# Patient Record
Sex: FEMALE | Race: WHITE | Employment: FULL TIME | URBAN - METROPOLITAN AREA
[De-identification: names, ages, dates, MRNs, and addresses within clinical notes are randomized per-mention and may not be internally consistent; named-entity substitution may affect disease eponyms.]

---

## 2023-12-25 ENCOUNTER — HOSPITAL ENCOUNTER (EMERGENCY)
Facility: HOSPITAL | Age: 33
Discharge: HOME | End: 2023-12-25
Attending: EMERGENCY MEDICINE
Payer: COMMERCIAL

## 2023-12-25 VITALS
SYSTOLIC BLOOD PRESSURE: 129 MMHG | OXYGEN SATURATION: 98 % | DIASTOLIC BLOOD PRESSURE: 90 MMHG | BODY MASS INDEX: 35.36 KG/M2 | RESPIRATION RATE: 20 BRPM | HEIGHT: 66 IN | HEART RATE: 112 BPM | TEMPERATURE: 99 F | WEIGHT: 220 LBS

## 2023-12-25 DIAGNOSIS — J02.9 PHARYNGITIS, UNSPECIFIED ETIOLOGY: Primary | ICD-10-CM

## 2023-12-25 PROCEDURE — 99283 EMERGENCY DEPT VISIT LOW MDM: CPT

## 2023-12-25 RX ORDER — ACETAMINOPHEN 500 MG
1000 TABLET ORAL ONCE
Status: COMPLETED | OUTPATIENT
Start: 2023-12-25 | End: 2023-12-25

## 2023-12-25 RX ORDER — PENICILLIN V POTASSIUM 500 MG/1
500 TABLET ORAL 4 TIMES DAILY
Qty: 40 TABLET | Refills: 0 | Status: SHIPPED | OUTPATIENT
Start: 2023-12-25 | End: 2024-01-04

## 2023-12-25 NOTE — ED QUICK NOTES
Alert oriented, no distress noted. C/o throat pain. Pt appears non toxic. This writer asked patient about swallowing pills prior to tylenol admin considering she's having difficulty wallowing per triage note. Pt states she can swallow the pills and medication was administered per mar.

## 2023-12-25 NOTE — ED INITIAL ASSESSMENT (HPI)
Pt presents stating a head cold for the past few days that she was handling. Then she started having pain to the left side of her tongue which has now progressed to the left side of her face and throat with pain swallowing and talking.

## 2024-11-29 NOTE — ED INITIAL ASSESSMENT (HPI)
Patient to ER with c/o abnormally heavy menstrual bleeding since 2000 yesterday. Patient's period first started yesterday. Patient with associated SOB and dizziness with any walking early afternoon today. Nausea intermittently. Patient is changing pad every hour.

## 2024-11-29 NOTE — CONSULTS
[facility]  Report of Consultation    Jermaine Leigh Patient Status:  Inpatient    1990 MRN [id number]   Location [facility] 4NW-A Attending Rossy De La O,    Hosp Day # 0 PCP No primary care provider on file.     Reason for Consultation:  Heavy menstrual bleeding to anemia    History of Present Illness:  Jermaine Leigh is a a(n) 34 year old G0 admitted for AUB to anemia. Pt reports this is the first time her menses has been so heavy - usually when it is heavy it is limited to a few hours, but this lasted a couple of days. She has never needed medication to regulate. Admittedly, she has not really seen a doctor since prior to the covid pandemic. She came to ED because she was feeling lightheaded and fatigued. Does feel like her bleeding has lessened today and overall feeling much better after blood transfusion.    History:  Past Medical History:    ADHD    Depression     Past Surgical History:   Procedure Laterality Date    Adenoidectomy  age 14 or 15    Appendectomy  2010    [facility] - laproscopic     Family History   Problem Relation Age of Onset    Mental Disorder Mother         depression    High Blood Pressure Maternal Grandmother     Other (Other) Maternal Grandmother         osteoporosis    High Blood Pressure Maternal Grandfather     Heart Disorder Maternal Grandfather         BYPASS SURGERY      reports that she has never smoked. She has never used smokeless tobacco. She reports that she does not drink alcohol and does not use drugs.    Gyn History:  Periods regular q4 weeks, lasting 4-6 days.  No h/o pap, maybe once had a pelvic exam.    OB History:  G0. No plans for pregnancy.    Allergies:  Allergies[1]    Medications:    Current Facility-Administered Medications:     acetaminophen (Tylenol Extra Strength) tab 500 mg, 500 mg, Oral, Q4H PRN    melatonin tab 3 mg, 3 mg, Oral, Nightly PRN    ondansetron (Zofran) 4 MG/2ML injection 4 mg, 4 mg, Intravenous, Q6H PRN     prochlorperazine (Compazine) 10 MG/2ML injection 5 mg, 5 mg, Intravenous, Q8H PRN    polyethylene glycol (PEG 3350) (Miralax) 17 g oral packet 17 g, 17 g, Oral, Daily PRN    sennosides (Senokot) tab 17.2 mg, 17.2 mg, Oral, Nightly PRN    bisacodyl (Dulcolax) 10 MG rectal suppository 10 mg, 10 mg, Rectal, Daily PRN    fleet enema (Fleet) rectal enema 133 mL, 1 enema, Rectal, Once PRN    benzonatate (Tessalon) cap 200 mg, 200 mg, Oral, TID PRN    glycerin-hypromellose- (Artificial Tears) 0.2-0.2-1 % ophthalmic solution 1 drop, 1 drop, Both Eyes, QID PRN    sodium chloride (Saline Mist) 0.65 % nasal solution 1 spray, 1 spray, Each Nare, Q3H PRN    escitalopram (Lexapro) tab 10 mg, 10 mg, Oral, QAM    Review of Systems:  Pertinent items are noted in HPI.    Physical Exam:  Blood pressure 122/70, pulse 98, temperature 98.5 °F (36.9 °C), temperature source Oral, resp. rate 18, height 66\", weight 220 lb (99.8 kg), last menstrual period 11/27/2024, SpO2 99%.    General: Alert, orientated x3.  Cooperative.  No apparent distress.  HEENT: Exam is unremarkable. Mucous membranes are moist.   Resp: breathing comfortably on room air  Abdomen:  Soft, non-distended, non-tender, with no rebound or guarding.  No peritoneal signs.  PELVIC: deferred  Extremities:  No lower extremity edema noted.  Without clubbing or cyanosis.    Skin: Normal texture and turgor.  Neurologic: Cranial nerves are grossly intact. No focal neurologic deficit.    Laboratory Data:   Latest Reference Range & Units 11/28/24 23:44   WBC 4.0 - 11.0 x10(3) uL 13.7 (H)   Hemoglobin 12.0 - 16.0 g/dL 5.9 (LL)   Hematocrit 35.0 - 48.0 % 20.5 (L)   Platelet Count 150.0 - 450.0 10(3)uL 354.0       Impression and Plan:  Patient Active Problem List   Diagnosis    ADD (attention deficit disorder)    Dysthymia    Anemia, unspecified type    Vaginal bleeding    Sinus tachycardia    Renal insufficiency       AUB to anemia  Hospitalist primary - appreciate management  S/p  2 units pRBC, repeat H&H to be collected  Added TSH and Prolactin to lab studies for further evaluation  Planned pelvic US today  Discussed r/b/a of management options to include medical or surgical options.   - medical: POP/OCP, vaginal ring, patch, depo provera, implant or IUD, or TXA  - surgical: endometrial ablation, UAE, or hysterectomy. Would encourage medical option to start.     Plan for outpatient follow up to further discuss management. Will also follow up on pending pelvic US.     All of the findings and plan were discussed with the patient.  Questions were answered to her satisfaction. She notes understanding and agrees with the plan of care.  OK for discharge from GYN standpoint once she is hemodynamically stable. Thank you for this consult.    Total time was 20 minutes, more than 50% of the time was spent in face-to-face counseling and/or coordination of care.      Mary Santiago MD  [facility]  11/29/2024 9:07 AM           [1]   Allergies  Allergen Reactions    Morphine NAUSEA AND VOMITING

## 2024-11-29 NOTE — PROGRESS NOTES
NURSING ADMISSION NOTE      Patient admitted via cart  Oriented to room.  Safety precautions initiated.  Bed in low position.  Call light in reach.  Continues  to have heavy vaginal bleeding. Consent signed. Sent for first unit of blood.  Tylenol for menstrual cramps. 1st unit of blood infusing, without adverse effects. Vitals stable. Seen by Dr De La O    2nd unit of blood started . Dr Morales notified of consult.

## 2024-11-29 NOTE — ED PROVIDER NOTES
Patient Seen in: [facility] [place]      History     Chief Complaint   Patient presents with    Eval-G    Dizziness     Stated Complaint: Heavy bleeding starting at 2000, period started yesterday. Changing pad every h*    Subjective:   Patient is a 34-year-old female presents emergency room for evaluation of vaginal bleeding.  Patient has a history of intermittent heavy vaginal bleeding she does not see an OB/GYN regularly she is not on birth control.  Patient reports her menses started yesterday and she had soaked through a pad about once an hour throughout the night.  Patient started having shortness of breath and lightheadedness and dizziness today.  Patient has a hemoglobin of 5.9 on her blood work.  She has not the most recent hemoglobin is available in epic system is from 14 years ago at which time it was 14                Objective:     Past Medical History:    ADHD    Depression              Past Surgical History:   Procedure Laterality Date    Adenoidectomy  age 14 or 15    Appendectomy  03/11/2010    [facility]                Social History     Socioeconomic History    Marital status: Single   Tobacco Use    Smoking status: Never    Smokeless tobacco: Never   Vaping Use    Vaping status: Never Used   Substance and Sexual Activity    Alcohol use: No    Drug use: No    Sexual activity: Never                  Physical Exam     ED Triage Vitals   BP 11/28/24 2334 (!) 115/95   Pulse 11/28/24 2334 (!) 129   Resp 11/28/24 2334 20   Temp 11/28/24 2334 97.8 °F (36.6 °C)   Temp src 11/28/24 2334 Oral   SpO2 11/28/24 2334 100 %   O2 Device 11/28/24 2330 None (Room air)       Current Vitals:   Vital Signs  BP: 121/77  Pulse: (!) 122  Resp: 20  Temp: 97.8 °F (36.6 °C)  Temp src: Oral  MAP (mmHg): 99    Oxygen Therapy  SpO2: 100 %  O2 Device: None (Room air)        Physical Exam  Vitals and nursing note reviewed.   Constitutional:       General: She is not in acute distress.      Appearance: She is well-developed. She is obese. She is not ill-appearing or toxic-appearing.   HENT:      Head: Normocephalic and atraumatic.      Mouth/Throat:      Mouth: Mucous membranes are moist.   Eyes:      Extraocular Movements: Extraocular movements intact.      Pupils: Pupils are equal, round, and reactive to light.      Comments: Pale conjunctiva   Cardiovascular:      Rate and Rhythm: Regular rhythm. Tachycardia present.      Heart sounds: Normal heart sounds.   Pulmonary:      Effort: Pulmonary effort is normal. No respiratory distress.      Breath sounds: No wheezing.      Comments: Patient speaking in full complete sentences no shortness of breath at rest  Abdominal:      General: Bowel sounds are normal. There is no distension.      Palpations: Abdomen is soft.      Tenderness: There is no abdominal tenderness.   Musculoskeletal:         General: Normal range of motion.   Skin:     Capillary Refill: Capillary refill takes less than 2 seconds.      Coloration: Skin is pale.   Neurological:      Mental Status: She is alert and oriented to person, place, and time.      GCS: GCS eye subscore is 4. GCS verbal subscore is 5. GCS motor subscore is 6.      Cranial Nerves: No cranial nerve deficit.   Psychiatric:         Mood and Affect: Mood normal. Mood is not anxious.         Behavior: Behavior normal.             ED Course     Labs Reviewed   CBC WITH DIFFERENTIAL WITH PLATELET - Abnormal; Notable for the following components:       Result Value    WBC 13.7 (*)     RBC 3.35 (*)     HGB 5.9 (*)     HCT 20.5 (*)     MCV 61.2 (*)     MCH 17.6 (*)     MCHC 28.8 (*)     Neutrophil Absolute Prelim 9.76 (*)     Neutrophil Absolute 9.76 (*)     All other components within normal limits   COMP METABOLIC PANEL (14) - Abnormal; Notable for the following components:    Glucose 155 (*)     CO2 19.0 (*)     Creatinine 1.18 (*)     All other components within normal limits   RBC MORPHOLOGY SCAN - Abnormal; Notable  for the following components:    RBC Morphology See morphology below (*)     All other components within normal limits   HCG, BETA SUBUNIT (QUANT PREGNANCY TEST) - Normal   PATH COMMENT CBC   TYPE AND SCREEN    Narrative:     The following orders were created for panel order Type and screen.  Procedure                               Abnormality         Status                     ---------                               -----------         ------                     ABORH (Blood Type)[[id number]]                               Final result               Antibody Screen[[id number]]                                  Final result                 Please view results for these tests on the individual orders.   ABORH (BLOOD TYPE)   ANTIBODY SCREEN   ABORH CONFIRMATION   RAINBOW DRAW LAVENDER   RAINBOW DRAW LIGHT GREEN   RAINBOW DRAW BLUE   RAINBOW DRAW GOLD     EKG    Rate, intervals and axes as noted on EKG Report.  Rate: 118  Rhythm:Sinus Rhythm  Readiing  Sinus tachycardia no ST elevation WA interval of 162 QRS is 70 QTc of 442 with axes and 14/41/30                Chest x-ray shows no focal consolidation no pneumothorax or pleural effusion normal, stable silhouette.  No acute osseous abnormalities       MDM      Social -negative tobacco, negative etoh, negative drugs  Family History-noncontributory  Past Medical History-ADHD, depression    Differential diagnosis before testing included anemia vaginal bleeding, endometriosis    Co-morbidities that add to the complexity of management include: Patient has not seen her doctor regularly    Testing ordered during this visit included hemoglobin, pregnancy test    Radiographic images    I personally reviewed the radiographs and my individual interpretation shows no acute process  I also reviewed the official reports that showed   Chest x-ray shows no focal consolidation no pneumothorax or pleural effusion normal, stable silhouette.  No acute osseous abnormalities        External  chart review showed review of Care Everywhere in epic system shows no related comorbidities to current presentation her last hemoglobin was checked in 2010 and at that time her hemoglobin was 14    History obtained by an independent source included from patient, family    Discussion of management with patient, family, hospital    Social determinants of health that affect care include patient does not have a primary care physician.  Her most recent hemoglobin level was from 2010      Medications Provided: Blood transfusions to be started at the [facility]    Course of Events during Emergency Room Visit include 34-year-old female who presents emergency room with vaginal bleeding that started last night.  Patient will be given IV fluids here will get blood transfusion ordered*for the patient once she gets over to the [facility] hospital but as we only have 1 unit of emergency blood at [place] it was not given here as patient is tachycardic but stable.  I discussed this with the hospitalist.  Patient be transported over to the [facility] hospital to have blood transfusion once there.          Disposition:    Admission  I have discussed with the patient the results of test, differential diagnosis, and treatment plan. They expressed clear understanding of these instructions and agrees to the plan provided.       Admission disposition: 11/29/2024 12:26 AM           Medical Decision Making      Disposition and Plan     Clinical Impression:  1. Anemia, unspecified type    2. Vaginal bleeding    3. Sinus tachycardia    4. Renal insufficiency         Disposition:  Admit  11/29/2024 12:26 am    Follow-up:  No follow-up provider specified.        Medications Prescribed:  Current Discharge Medication List              Supplementary Documentation:         Hospital Problems       Present on Admission  Date Reviewed: 3/3/2020            ICD-10-CM Noted POA    * (Principal) Anemia, unspecified type D64.9 11/29/2024 Unknown

## 2024-11-29 NOTE — H&P
[facility]     Jermaine Leigh Patient Status:  Emergency    1990 MRN [id number]   Location [facility] IN [place] Attending Rossy Mcintosh DO   Hosp Day # 0 PCP No primary care provider on file.     Chief Complaint: vaginal bleeding    Subjective:    History of Present Illness:     Jermaine Leigh is a 34 year old female with PMHx depression who presented to the hospital for heavy vaginal bleeding. She started her menstrual cycle on the  and was initially waking up every hour to change her pad. She then started to notice large blood clots. She has a history of heavy periods but the longest the heavy bleeding usually lasts is 12 hours. Her bleeding continued at the same pace during the next day and she was continuing to pass a significant amount of clots. She had associated pelvic cramping rated 2/10 which is typical during her period. She tried to eat some food high in iron and initially felt a little better. Tonight she started to feel dizzy and was getting winded with palpitations and nausea any time she was walking. She had one episode of emesis on her way to to the car.    History/Other:    Past Medical History:  Past Medical History:    ADHD    Depression     Past Surgical History:   Past Surgical History:   Procedure Laterality Date    Adenoidectomy  age 14 or 15    Appendectomy  2010    [facility] - lap[facility]      Family History:   Family History   Problem Relation Age of Onset    Mental Disorder Mother         depression    High Blood Pressure Maternal Grandmother     Other (Other) Maternal Grandmother         osteoporosis    High Blood Pressure Maternal Grandfather     Heart Disorder Maternal Grandfather         BYPASS SURGERY     Social History:    reports that she has never smoked. She has never used smokeless tobacco. She reports that she does not drink alcohol and does not use drugs.     Allergies: Allergies[1]    Medications:   Medications Ordered Prior to Encounter[2]    Review of Systems:   A comprehensive review of systems was completed.    Pertinent positives and negatives noted in the HPI.    Objective:   Physical Exam:    /77   Pulse (!) 122   Temp 97.8 °F (36.6 °C) (Oral)   Resp 20   Ht 5' 6\" (1.676 m)   Wt 220 lb (99.8 kg)   LMP 11/27/2024   SpO2 100%   BMI 35.51 kg/m²   General: No acute distress, Alert  Respiratory: No rhonchi, no wheezes  Cardiovascular: S1, S2. Regular rate and rhythm  Abdomen: Soft, mild pain to palpation of LLQ, non-distended, positive bowel sounds  Neuro: No new focal deficits  Extremities: No edema    Results:    Labs:      Labs Last 24 Hours:    Recent Labs   Lab 11/28/24  2344   RBC 3.35*   HGB 5.9*   HCT 20.5*   MCV 61.2*   MCH 17.6*   MCHC 28.8*   RDW 19.4   NEPRELIM 9.76*   WBC 13.7*   .0       Recent Labs   Lab 11/28/24  2344   *   BUN 17   CREATSERUM 1.18*   EGFRCR 63   CA 9.1   ALB 4.1      K 3.6      CO2 19.0*   ALKPHO 49   AST 15   ALT 21   BILT 0.5   TP 6.3       No results found for: \"PT\", \"INR\"    No results for input(s): \"TROP\", \"TROPHS\", \"CK\" in the last 168 hours.    No results for input(s): \"TROP\", \"PBNP\" in the last 168 hours.    No results for input(s): \"PCT\" in the last 168 hours.    Imaging: Imaging data reviewed in Epic.    Assessment & Plan:      #Symptomatic anemia  #Vaginal bleeding  #sinus tachycardia  -hgb 5.9: baseline 14.6 in 2010  -transfuse 2 U PRBC and recheck CBC, cont to transfuse for hgb <7  -obtain pelvic US  -obtain iron studies, retic count  -monitor on telemetry- suspect tachycardia 2/2 bleeding  -GYN c/s    #NAGMA  -CO2 of 19 with AG of 8  -cont to monitor BMP    #elevated serum creatinine  -Cr 1.18 with baseline 1.23  -at baseline    #leukocytosis  -WBC 13.7  -no infectious symptoms, suspect 2/2 acute stress  -cont to monitor CBC    #depression  -cont home escitalopram      Plan of care discussed with ED physician    Rossy  DO Jairon    Supplementary Documentation:     The 21st Century Cures Act makes medical notes like these available to patients in the interest of transparency. Please be advised this is a medical document. Medical documents are intended to carry relevant information, facts as evident, and the clinical opinion of the practitioner. The medical note is intended as peer to peer communication and may appear blunt or direct. It is written in medical language and may contain abbreviations or verbiage that are unfamiliar.                                       [1]   Allergies  Allergen Reactions    Morphine NAUSEA AND VOMITING   [2]   No current facility-administered medications on file prior to encounter.     Current Outpatient Medications on File Prior to Encounter   Medication Sig Dispense Refill    ESCITALOPRAM 10 MG Oral Tab TAKE 1 TABLET(10 MG) BY MOUTH EVERY DAY 90 tablet 0

## 2024-11-29 NOTE — SPIRITUAL CARE NOTE
Spiritual Care Visit Note    Patient Name: Jermaine Leigh Date of Spiritual Care Visit: 24   : 1990 Primary Dx: Anemia, unspecified type       Referred By:      Spiritual Care Taxonomy:    Intended Effects: Demonstrate caring and concern    Methods: Collaborate with care team member;Explore spiritual/Yarsanism beliefs    Interventions: Active listening;Ask guided questions;Explain  role    Visit Type/Summary:     - Spiritual Care: Consulted with RN prior to visit. Offered empathic listening and emotional support. Patient and family expressed appreciation for  visit. Provided information regarding how to contact Spiritual Care and left a Spiritual Care information card.  remains available as needed for follow up.    Spiritual Care support can be requested via an Epic consult. For urgent/immediate needs, please contact the On Call  at: [place]: ext [phone]     Chaplain Resident Melanie Kohli MA

## 2024-11-30 NOTE — PLAN OF CARE
Pt A+Ox4, denies dizziness, no HA, no SOB, no CP.  AM Hgb stable from midnight, remains at 7.1.  Pt reports using 1 dina-pad overnight and that flow is less heavy and no clots.  Given plain Tylenol and heat packs PRN for menstrual cramps with adequate relief.  VSS, afebrile.  Call light within reach, mother at bedside.    Problem: CARDIOVASCULAR - ADULT  Goal: Maintains optimal cardiac output and hemodynamic stability  Description: INTERVENTIONS:  - Monitor vital signs, rhythm, and trends  - Monitor for bleeding, hypotension and signs of decreased cardiac output  - Evaluate effectiveness of vasoactive medications to optimize hemodynamic stability  - Monitor arterial and/or venous puncture sites for bleeding and/or hematoma  - Assess quality of pulses, skin color and temperature  - Assess for signs of decreased coronary artery perfusion - ex. Angina  - Evaluate fluid balance, assess for edema, trend weights  Outcome: Progressing     Problem: PAIN - ADULT  Goal: Verbalizes/displays adequate comfort level or patient's stated pain goal  Description: INTERVENTIONS:  - Encourage pt to monitor pain and request assistance  - Assess pain using appropriate pain scale  - Administer analgesics based on type and severity of pain and evaluate response  - Implement non-pharmacological measures as appropriate and evaluate response  - Consider cultural and social influences on pain and pain management  - Manage/alleviate anxiety  - Utilize distraction and/or relaxation techniques  - Monitor for opioid side effects  - Notify MD/LIP if interventions unsuccessful or patient reports new pain  - Anticipate increased pain with activity and pre-medicate as appropriate  Outcome: Progressing

## 2024-11-30 NOTE — PROGRESS NOTES
[facility]   part of [facility]     Hospitalist Progress Note     Jermaine Leigh Patient Status:  Inpatient    1990 MRN [id number]   [facility] 4NW-A Attending Rossy De La O,    Hosp Day # 1 PCP No primary care provider on file.     Chief Complaint: vaginal bleeding    Subjective:     Patient states vaginal bleeding is much better no abd pain no cp no sob    Objective:    Review of Systems:   A comprehensive review of systems was completed; pertinent positive and negatives stated in subjective.    Vital signs:  Temp:  [97.6 °F (36.4 °C)-98.2 °F (36.8 °C)] 97.6 °F (36.4 °C)  Pulse:  [] 80  Resp:  [15-20] 15  BP: (119-149)/(72-86) 149/86  SpO2:  [97 %-100 %] 97 %    Physical Exam:    General: No acute distress  Respiratory: No wheezes, no rhonchi  Cardiovascular: S1, S2, regular rate and rhythm  Abdomen: Soft, Non-tender, non-distended, positive bowel sounds  Neuro: No new focal deficits.   Extremities: No edema      Diagnostic Data:    Labs:  Recent Labs   Lab 24  2344 24  1027 24  2057 24  0931   WBC 13.7* 9.8  --  9.1   HGB 5.9* 7.4* 7.1* 7.1*   MCV 61.2* 70.9*  --  68.6*   .0 240.0  --  274.0       Recent Labs   Lab 24  2344 24  0518   * 117*   BUN 17 15   CREATSERUM 1.18* 0.83   CA 9.1 7.9*   ALB 4.1  --     141   K 3.6 3.5    113*   CO2 19.0* 20.0*   ALKPHO 49  --    AST 15  --    ALT 21  --    BILT 0.5  --    TP 6.3  --        Estimated Creatinine Clearance: 91.1 mL/min (based on SCr of 0.83 mg/dL).    No results for input(s): \"TROP\", \"TROPHS\", \"CK\" in the last 168 hours.    No results for input(s): \"PTP\", \"INR\" in the last 168 hours.               Microbiology    No results found for this visit on 24.      Imaging: Reviewed in Epic.    Medications:    escitalopram  10 mg Oral QAM       Assessment & Plan:      #anemia better  #Vaginal bleeding better  #sinus tachycardia resolved  -hgb 5.9: baseline 14.6  in 2010  -transfused 2 U PRBC   - pelvic US reviewed  -GYN c/s appreciated     #NAGMA  -CO2 of 19 with AG of 8  -cont to monitor BMP     #elevated serum creatinine  -Cr 1.18 with baseline 1.23  -at baseline     #leukocytosis  -WBC 13.7  -no infectious symptoms, suspect 2/2 acute stress  -cont to monitor CBC     #depression  -cont home escitalopram      Liss London MD    Supplementary Documentation:     Quality:  DVT Mechanical Prophylaxis:        DVT Pharmacologic Prophylaxis   Medication   None                Code Status: Not on file  Dela Cruz: No urinary catheter in place  Dela Cruz Duration (in days):   Central line:    UVALDO:     Discharge is dependent on: progress  At this point Ms. Leigh is expected to be discharge to: home    The 21st Century Cures Act makes medical notes like these available to patients in the interest of transparency. Please be advised this is a medical document. Medical documents are intended to carry relevant information, facts as evident, and the clinical opinion of the practitioner. The medical note is intended as peer to peer communication and may appear blunt or direct. It is written in medical language and may contain abbreviations or verbiage that are unfamiliar.

## 2024-11-30 NOTE — PROGRESS NOTES
Report menstrual flow has returned to \"normal \" flow. Edwin small bites of food. Tylenol for menstrual; cramps. Went for CT abdomen and pelvis

## 2024-11-30 NOTE — PLAN OF CARE
Pt A&Ox4, VSS, on room air. C/o pelvic pain managed with PO acetaminophen and hot packs. 2nd unit of blood finished infusing this morning; HgB at redraw 7.4. Pt and family informed. Pt's appetite is still low but improved during the day. Frequent round, call light within reach.     Problem: CARDIOVASCULAR - ADULT  Goal: Maintains optimal cardiac output and hemodynamic stability  Description: INTERVENTIONS:  - Monitor vital signs, rhythm, and trends  - Monitor for bleeding, hypotension and signs of decreased cardiac output  - Evaluate effectiveness of vasoactive medications to optimize hemodynamic stability  - Monitor arterial and/or venous puncture sites for bleeding and/or hematoma  - Assess quality of pulses, skin color and temperature  - Assess for signs of decreased coronary artery perfusion - ex. Angina  - Evaluate fluid balance, assess for edema, trend weights  Outcome: Progressing     Problem: PAIN - ADULT  Goal: Verbalizes/displays adequate comfort level or patient's stated pain goal  Description: INTERVENTIONS:  - Encourage pt to monitor pain and request assistance  - Assess pain using appropriate pain scale  - Administer analgesics based on type and severity of pain and evaluate response  - Implement non-pharmacological measures as appropriate and evaluate response  - Consider cultural and social influences on pain and pain management  - Manage/alleviate anxiety  - Utilize distraction and/or relaxation techniques  - Monitor for opioid side effects  - Notify MD/LIP if interventions unsuccessful or patient reports new pain  - Anticipate increased pain with activity and pre-medicate as appropriate  Outcome: Progressing

## 2024-12-01 NOTE — PROGRESS NOTES
[facility]  Obstetrics and Gynecology Consultation   Progress Note    Jermaine Leigh Patient Status:  Inpatient    1990 MRN [id number]   Location [facility] 4NW-A Attending Rossy De La O DO   Hospital Day 2 PCP No primary care provider on file.     Reason for Consultation: Heavy menstrual bleeding and acute blood-loss anemia      Subjective:     Jermaine Leigh is a 34 year old G0 female who was admitted on 2024 for AUB and anemia. She reported feeling dizzy with worsening pelvic cramps overnight, stat Hgb 6.5. Pt received additional 1u pRBC.     This AM, pt feeling slightly better. Denies CP, SOB, HA, dizziness, lightheadedness, N/V, or abdominal pain. Still has some mild cramping that is much improved after PO Oxycodone last night. Bleeding is decreasing. Tolerating general diet. Ambulating without difficulty. Voiding spontaneously.     Objective:     Vitals:    24 0800   BP: 131/84   Pulse: 78   Resp: 18   Temp: 97.5 °F (36.4 °C)       General: Alert, orientated x3.  Cooperative.  No apparent distress.  HEENT: Exam is unremarkable. Mucous membranes are moist.   Resp: breathing comfortably on room air  Abdomen:  Soft, non-distended, non-tender, with no rebound or guarding.  No peritoneal signs.  PELVIC: deferred  Extremities:  No lower extremity edema noted.  Without clubbing or cyanosis.    Skin: Normal texture and turgor.  Neurologic: Cranial nerves are grossly intact. No focal neurologic deficit.    Labs:  Lab Results   Component Value Date    WBC 9.1 2024    HGB 7.8 2024    HCT 25.0 2024    .0 2024       Imaging:  PROCEDURE:  US PELVIS (TRANSABDOMINAL PELVIS)  (CPT=76856)     COMPARISON:  None.     INDICATIONS:  Heavy bleeding starting at , period started yesterday. Changing pad every hour.  Dizziness with walking and nausea.     TECHNIQUE:  Transabdominal imaging was performed of the pelvis.     PATIENT STATED HISTORY: (As  transcribed by Technologist)           FINDINGS:                UTERUS:  11.29 cm x 5.59 cm x 6.17 cm    Endometrium Thickness: 1.29 cm    The uterus appears normal in size, shape, and echogenicity.  RIGHT OVARY:  3.59 cm x 2.46 cm x 3.60 cm    The right ovary appears normal in size, shape, and echogenicity. No significant masses are identified.  LEFT OVARY:  3.42 cm x 2.49 cm x 2.77 cm    The left ovary appears normal in size, shape, and echogenicity. No significant masses are identified.  CUL-DE-SAC:  Unremarkable.     OTHER:  There is a 9.5 x 9.5 x 11.6 cm indeterminate cystic structure seen at the midline superior to the bladder and in close proximity to the right adnexa.   Impression   CONCLUSION:  There is a 9.5 x 9.5 x 11.6 cm indeterminate cystic structure seen at the midline superior to the bladder and in close proximity to the right adnexa.  A contrast-enhanced CT of the abdomen/pelvis is recommended for further assessment.       PROCEDURE: 11/29 CT ABDOMEN+PELVIS (ALL W+WO) (CPT=74178)     COMPARISON:  [facility] , US, US PELVIS (TRANSABDOMINAL PELVIS)  (CPT=76856), 11/29/2024, 1:14 PM.  [facility] , CT, APPENDIX ABD/PEL(S) - SET, 3/10/2010, 11:28 PM.     INDICATIONS:  cystic mass noted on pelvic ultrasound     TECHNIQUE:  Noncontrast scanning through the abdomen and pelvis was performed from the dome of the diaphragm to the pubic symphysis followed by IV contrast-enhanced multislice CT scanning through the abdomen and pelvis using nonionic contrast.  Post  contrast coronal MPR imaging was performed.  Dose reduction techniques were used. Dose information is transmitted to the ACR (American College of Radiology) NRDR (National Radiology Data Registry) which includes the Dose Index Registry.     PATIENT STATED HISTORY:(As transcribed by Technologist)  Cystic mass found on US, heavy menses      CONTRAST USED:  100cc of Isovue 370     FINDINGS:    LIVER:  There is diffuse low attenuation consistent with mild fatty  infiltration.  BILIARY:  Contracted gallbladder.  No biliary dilatation.  PANCREAS:  Uniform parenchyma.  No ductal dilatation.  SPLEEN:  Not enlarged.  KIDNEYS:  Normal anatomic positions.  No hydronephrosis or perinephric stranding.  Uniform parenchymal enhancement.  ADRENALS:  Not enlarged.  AORTA/VASCULAR:  Smooth tapering.  Patent celiac artery, SMA and ABIGAIL.  Patent splenic vein and portal vein.  RETROPERITONEUM:  No adenopathy.  BOWEL/MESENTERY:  Normal bowel caliber.  No colonic inflammation.  Moderate to large amount of stool scattered throughout the colon.  No ascites.  Surgical changes are present which may be related to prior appendectomy.  ABDOMINAL WALL:  No hernia.  URINARY BLADDER:  Nondistended.  Smooth contour.  PELVIC NODES:  None enlarged.  PELVIC ORGANS:  There is a hypoattenuating mass, exophytic from the right uterine fundus measuring 4.3 cm consistent with a fibroid.    There is a circumscribed low-attenuation mass in the midline pelvis superior and separate from the urinary bladder and uterine fundus.  Hounsfield units recorded at 20. It is measured at 12.4 x 9.8 cm.  There is an area of focal wall thickening or  nodularity in the left anterior aspect.  Series 2, image 90. This abuts and is inseparable from the right ovary.  The right ovary has an additional ruptured/involuting follicles/functional cyst.  The left ovary appears unremarkable.    BONES:  Normal vertebral body heights and disc spaces.  LUNG BASES:  No consolidation or pleural effusion.  OTHER:  None.     Impression   CONCLUSION:    1. Large midline cystic pelvic mass.  It is inseparable from the right ovary and is likely ovarian in origin.  Based on the large size and mild complexity, cystic neoplasm must be considered, with benign etiologies more likely than malignant.  This  correlates with the ultrasound finding.  2. Exophytic fibroid from the right uterine fundus.  3. Details as above.  Continued clinical correlation  recommended.         Assessment:     Jermaine Leigh is a 34 year old G0 female who was admitted on 11/28/2024 for abnormal uterine bleeding and acute blood loss anemia. Gynecology on consult.    Patient Active Problem List   Diagnosis    ADD (attention deficit disorder)    Dysthymia    Anemia, unspecified type    Vaginal bleeding    Sinus tachycardia    Renal insufficiency         Plan:     AUB and ABLA  Pelvic Mass, Right adnexa  - hospitalist primary - appreciate management  - s/p 3u pRBCs, repeat CBC 7.8 this AM  - TSH and prolactin wnl and unremarkable  - Pelvic US 11/29/24 notable for large 9.5 x 9.5 x 11.6 cm indeterminate cystic structure seen in midline superior to bladder and in close proximity to right adnexa  - CT AP 11/29/24 ordered for further clarification of pelvic mass -> notable for circumscribed low-attenuation mass in midline pelvis superior to bladder that abuts and is inseparable from right ovary, measuring 12.4 x 9.8 cm, there is an area of focal wall thickening or nodularity in left anterior aspect of right adnexal mass; based on large size and mild complexity, cystic neoplasm must be considered, with benign etiologies more likely than malignant  - Tumor markers negative (CA-125, CA 19-9, CEA, LDH, AFP); pending Inhibin A/B  - Discussed r/b/a of management for adnexal mass which will most likely need surgical intervention due to large size and complexity on imaging; pending tumor markers will determine whether additional consultation with surgical oncology is appropriate  - Discussed r/b/a of AUB management including medical vs surgical options; recommended medical options to start; Will add PO TXA 1.3 mg TID prn for bleeding intervention  - Can do one time dose of IV TXA while inpatient to help with vaginal bleeding if indicated  - Plan for outpatient follow up to discuss further management of pelvic mass and AUB    Dispo: per primary; ok for discharge from GYN standpoint once patient is  hemodynamically stable. Thank you for this consult.    All of the findings and plan were discussed with the patient.  She notes understanding and agrees with the plan of care.  All questions were answered to the best of my ability at this time.      Total patient time was 20 minutes in evaluation, consultation, and coordination of care. This included face to face and non-face to face actions. The patient's questions and concerns were addressed.      Sandy Briones DO   [facility] - OBGYN  12/01/24 10:24 AM      Discussed with patient that there will not be further notification of normal or benign results other than receiving results on [employer]. A [employer] message or telephone call will be placed by the physician and/or office staff if results are abnormal.     Note to patient and family   The 21st Century Cures Act makes medical notes available to patients in the interest of transparency.  However, please be advised that this is a medical document.  It is intended as [id number] communication.  It is written and medical language may contain abbreviations or verbiage that are technical and unfamiliar.  It may appear blunt or direct.  Medical documents are intended to carry relevant information, facts as evident, and the clinical opinion of the practitioner.        This note could include assistance by Dragon voice recognition. Errors in content may be related to improper recognition by the system; efforts to review and correct have been done but errors may still exist.

## 2024-12-01 NOTE — PROGRESS NOTES
[facility]   part of [facility]     Hospitalist Progress Note     Jermaine Leigh Patient Status:  Inpatient    1990 MRN [id number]   [facility] 4NW-A Attending Rossy De La O,    Hosp Day # 2 PCP No primary care provider on file.     Chief Complaint: vaginal bleeding    Subjective:     Patient states vaginal bleeding is better today  Significant bleeding last night    Objective:    Review of Systems:   A comprehensive review of systems was completed; pertinent positive and negatives stated in subjective.    Vital signs:  Temp:  [97.5 °F (36.4 °C)-98.7 °F (37.1 °C)] 97.9 °F (36.6 °C)  Pulse:  [73-91] 85  Resp:  [16-18] 18  BP: (118-146)/(67-90) 118/82  SpO2:  [96 %-100 %] 97 %    Physical Exam:    General: No acute distress  Respiratory: No wheezes, no rhonchi  Cardiovascular: S1, S2, regular rate and rhythm  Abdomen: Soft, Non-tender, non-distended, positive bowel sounds  Neuro: No new focal deficits.   Extremities: No edema      Diagnostic Data:    Labs:  Recent Labs   Lab 24  2344 24  1027 24  2057 24  0931 24  2137 24  0705   WBC 13.7* 9.8  --  9.1  --  9.1   HGB 5.9* 7.4* 7.1* 7.1* 6.5* 7.8*   MCV 61.2* 70.9*  --  68.6*  --  72.0*   .0 240.0  --  274.0  --  270.0       Recent Labs   Lab 24  2344 24  0518   * 117*   BUN 17 15   CREATSERUM 1.18* 0.83   CA 9.1 7.9*   ALB 4.1  --     141   K 3.6 3.5    113*   CO2 19.0* 20.0*   ALKPHO 49  --    AST 15  --    ALT 21  --    BILT 0.5  --    TP 6.3  --        Estimated Creatinine Clearance: 91.1 mL/min (based on SCr of 0.83 mg/dL).    No results for input(s): \"TROP\", \"TROPHS\", \"CK\" in the last 168 hours.    No results for input(s): \"PTP\", \"INR\" in the last 168 hours.               Microbiology    No results found for this visit on 24.      Imaging: Reviewed in Epic.    Medications:    sodium ferric gluconate  125 mg Intravenous Nightly    escitalopram  10 mg  Oral QAM       Assessment & Plan:      #anemia better  #Vaginal bleeding better today  #sinus tachycardia resolved  -hgb 5.9: baseline 14.6 in 2010  -transfused 2 U PRBC upon coming in and 1 unit yesterday  - pelvic US reviewed  -GYN c/s appreciated     #NAGMA    -cont to monitor BMP     #elevated serum creatinine  -Cr 1.18 with baseline 1.23  -at baseline     #leukocytosis  -resolved     #depression  -cont home escitalopram      Liss London MD    Supplementary Documentation:     Quality:  DVT Mechanical Prophylaxis:        DVT Pharmacologic Prophylaxis   Medication   None                Code Status: Not on file  Dela Cruz: No urinary catheter in place  Dela Cruz Duration (in days):   Central line:    UVALDO:     Discharge is dependent on: progress  At this point Ms. Leigh is expected to be discharge to: home    The 21st Century Cures Act makes medical notes like these available to patients in the interest of transparency. Please be advised this is a medical document. Medical documents are intended to carry relevant information, facts as evident, and the clinical opinion of the practitioner. The medical note is intended as peer to peer communication and may appear blunt or direct. It is written in medical language and may contain abbreviations or verbiage that are unfamiliar.

## 2024-12-01 NOTE — PLAN OF CARE
Pt A+Ox4, reports feeling \"better\" after last night's 1U PRBC for Hgb 6.5.  AM Hgb 7.8, menstrual bleeding less, no longer soaking pads.  Pt still with intermittent abdominal cramping, relieved with plain Tylenol.  During infusion of Cykokapron, pt c/o nausea and vomited approx 150mL of undigested bood.  Dr. Briones aware, pt has good relief with Zofran PRN.  VSS, afebrile.  Addendum @1845:  CBC shows Hgb up to 8.7, MD notified.    Problem: CARDIOVASCULAR - ADULT  Goal: Maintains optimal cardiac output and hemodynamic stability  Description: INTERVENTIONS:  - Monitor vital signs, rhythm, and trends  - Monitor for bleeding, hypotension and signs of decreased cardiac output  - Evaluate effectiveness of vasoactive medications to optimize hemodynamic stability  - Monitor arterial and/or venous puncture sites for bleeding and/or hematoma  - Assess quality of pulses, skin color and temperature  - Assess for signs of decreased coronary artery perfusion - ex. Angina  - Evaluate fluid balance, assess for edema, trend weights  Outcome: Progressing     Problem: PAIN - ADULT  Goal: Verbalizes/displays adequate comfort level or patient's stated pain goal  Description: INTERVENTIONS:  - Encourage pt to monitor pain and request assistance  - Assess pain using appropriate pain scale  - Administer analgesics based on type and severity of pain and evaluate response  - Implement non-pharmacological measures as appropriate and evaluate response  - Consider cultural and social influences on pain and pain management  - Manage/alleviate anxiety  - Utilize distraction and/or relaxation techniques  - Monitor for opioid side effects  - Notify MD/LIP if interventions unsuccessful or patient reports new pain  - Anticipate increased pain with activity and pre-medicate as appropriate  Outcome: Progressing

## 2024-12-02 NOTE — PAYOR COMM NOTE
--------------  ADMISSION REVIEW     Payor: [employer] OUT OF STATE POS/MCNP  Subscriber #:  [id number]  Authorization Number: [id number]    Admit date: 11/29/24  Admit time:  2:20 AM       REVIEW DOCUMENTATION:     ED Provider Notes        ED Provider Notes signed by Rossy Mcintosh DO at 11/29/2024  2:19 AM          Chief Complaint   Patient presents with    Eval-G    Dizziness     Stated Complaint: Heavy bleeding starting at 2000, period started yesterday. Changing pad every h*    Subjective:   Patient is a 34-year-old female presents emergency room for evaluation of vaginal bleeding.  Patient has a history of intermittent heavy vaginal bleeding she does not see an OB/GYN regularly she is not on birth control.  Patient reports her menses started yesterday and she had soaked through a pad about once an hour throughout the night.  Patient started having shortness of breath and lightheadedness and dizziness today.  Patient has a hemoglobin of 5.9 on her blood work.  She has not the most recent hemoglobin is available in epic system is from 14 years ago at which time it was 14                Objective:     Past Medical History:    ADHD    Depression              Past Surgical History:   Procedure Laterality Date    Adenoidectomy  age 14 or 15    [place]    [facility] [place] Vitals   BP 11/28/24 2334 (!) 115/95   Pulse 11/28/24 2334 (!) 129   Resp 11/28/24 2334 20   Temp 11/28/24 2334 97.8 °F (36.6 °C)   Temp src 11/28/24 2334 Oral   SpO2 11/28/24 2334 100 %   O2 Device 11/28/24 2330 None (Room air)       Current Vitals:   Vital Signs  BP: 121/77  Pulse: (!) 122  Resp: 20  Temp: 97.8 °F (36.6 °C)  Temp src: Oral  MAP (mmHg): 99    Oxygen Therapy  SpO2: 100 %  O2 Device: None (Room air)        Physical Exam  Vitals and nursing note reviewed.   Constitutional:       General: She is not in acute distress.     Appearance: She is well-developed. She is obese. She is not ill-appearing or  toxic-appearing.   HENT:      Head: Normocephalic and atraumatic.      Mouth/Throat:      Mouth: Mucous membranes are moist.   Eyes:      Extraocular Movements: Extraocular movements intact.      Pupils: Pupils are equal, round, and reactive to light.      Comments: Pale conjunctiva   Cardiovascular:      Rate and Rhythm: Regular rhythm. Tachycardia present.      Heart sounds: Normal heart sounds.   Pulmonary:      Effort: Pulmonary effort is normal. No respiratory distress.      Breath sounds: No wheezing.      Comments: Patient speaking in full complete sentences no shortness of breath at rest  Abdominal:      General: Bowel sounds are normal. There is no distension.      Palpations: Abdomen is soft.      Tenderness: There is no abdominal tenderness.   Musculoskeletal:         General: Normal range of motion.   Skin:     Capillary Refill: Capillary refill takes less than 2 seconds.      Coloration: Skin is pale.   Neurological:      Mental Status: She is alert and oriented to person, place, and time.      GCS: GCS eye subscore is 4. GCS verbal subscore is 5. GCS motor subscore is 6.      Cranial Nerves: No cranial nerve deficit.   Psychiatric:         Mood and Affect: Mood normal. Mood is not anxious.         Behavior: Behavior normal.       CBC WITH DIFFERENTIAL WITH PLATELET - Abnormal; Notable for the following components:       Result Value    WBC 13.7 (*)     RBC 3.35 (*)     HGB 5.9 (*)     HCT 20.5 (*)     MCV 61.2 (*)     MCH 17.6 (*)     MCHC 28.8 (*)     Neutrophil Absolute Prelim 9.76 (*)     Neutrophil Absolute 9.76 (*)     All other components within normal limits   COMP METABOLIC PANEL (14) - Abnormal; Notable for the following components:    Glucose 155 (*)     CO2 19.0 (*)     Creatinine 1.18 (*)     All other components within normal limits   RBC MORPHOLOGY SCAN - Abnormal; Notable for the following components:    RBC Morphology See morphology below (*)      EKG    Rate, intervals and axes as  noted on EKG Report.  Rate: 118  Rhythm:Sinus Rhythm  Readiing  Sinus tachycardia no ST elevation CT interval of Patient will be given IV fluids here will get blood transfusion ordered*for the patient once she gets over to the main hospital but as we only have 1 unit of emergency blood at [facility] it was not given here as patient is tachycardic but stable.  I discussed this with the hospitalist.  Patient be transported over to the main hospital to have blood transfusion once there.          Clinical Impression:  1. Anemia, unspecified type    2. Vaginal bleeding    3. Sinus tachycardia    4. Renal insufficiency         Disposition:  Admit       Present on Admission  Date Reviewed: 3/3/2020            ICD-10-CM Noted POA    * (Principal) Anemia, unspecified type D64.9 11/29/2024 Unknown         Signed by Rossy Mcintosh DO on 11/29/2024  2:19 AM         11/29 H&P      Chief Complaint: vaginal bleeding        Subjective:  History of Present Illness:      Jermaine Leigh is a 34 year old female with PMHx depression who presented to the hospital for heavy vaginal bleeding. She started her menstrual cycle on the 27th and was initially waking up every hour to change her pad. She then started to notice large blood clots. She has a history of heavy periods but the longest the heavy bleeding usually lasts is 12 hours. Her bleeding continued at the same pace during the next day and she was continuing to pass a significant amount of clots. She had associated pelvic cramping rated 2/10 which is typical during her period. She tried to eat some food high in iron and initially felt a little better. Tonight she started to feel dizzy and was getting winded with palpitations and nausea any time she was walking. She had one episode of emesis on her way to to the car.        Assessment & Plan:  #Symptomatic anemia  #Vaginal bleeding  #sinus tachycardia  -hgb 5.9: baseline 14.6 in 2010  -transfuse 2 U PRBC and recheck CBC, cont to  transfuse for hgb <7  -obtain pelvic US  -obtain iron studies, retic count  -monitor on telemetry- suspect tachycardia 2/2 bleeding  -GYN c/s     #NAGMA  -CO2 of 19 with AG of 8  -cont to monitor BMP     #elevated serum creatinine  -Cr 1.18 with baseline 1.23  -at baseline     #leukocytosis  -WBC 13.7  -no infectious symptoms, suspect 2/2 acute stress  -cont to monitor CBC     #depression  -cont home escitalopram      11/29 consult OB/Gyn    Reason for Consultation:  Heavy menstrual bleeding to anemia     History of Present Illness:  Jermaine Leigh is a a(n) 34 year old G0 admitted for AUB to anemia. Pt reports this is the first time her menses has been so heavy - usually when it is heavy it is limited to a few hours, but this lasted a couple of days. She has never needed medication to regulate. Admittedly, she has not really seen a doctor since prior to the covid pandemic. She came to ED because she was feeling lightheaded and fatigued. Does feel like her bleeding has lessened today and overall feeling much better after blood transfusion.       Gyn History:  Periods regular q4 weeks, lasting 4-6 days.  No h/o pap, maybe once had a pelvic exam.     Physical Exam:  Blood pressure 122/70, pulse 98, temperature 98.5 °F (36.9 °C), temperature source Oral, resp. rate 18, height 66\", weight 220 lb (99.8 kg), last menstrual period 11/27/2024, SpO2 99%.     General: Alert, orientated x3.  Cooperative.  No apparent distress.  HEENT: Exam is unremarkable. Mucous membranes are moist.   Resp: breathing comfortably on room air  Abdomen:  Soft, non-distended, non-tender, with no rebound or guarding.  No peritoneal signs.  PELVIC: deferred  Extremities:  No lower extremity edema noted.  Without clubbing or cyanosis.    Skin: Normal texture and turgor.  Neurologic: Cranial nerves are grossly intact. No focal neurologic deficit.     Laboratory Data:    Latest Reference Range & Units 11/28/24 23:44   WBC 4.0 - 11.0 x10(3) uL 13.7  (H)   Hemoglobin 12.0 - 16.0 g/dL 5.9 (LL)   Hematocrit 35.0 - 48.0 % 20.5 (L)   Platelet Count 150.0 - 450.0 10(3)uL 354.0       Impression and Plan:      Patient Active Problem List   Diagnosis    ADD (attention deficit disorder)    Dysthymia    Anemia, unspecified type    Vaginal bleeding    Sinus tachycardia    Renal insufficiency         AUB to anemia  Hospitalist primary - appreciate management  S/p 2 units pRBC, repeat H&H to be collected  Added TSH and Prolactin to lab studies for further evaluation  Planned pelvic US today  Discussed r/b/a of management options to include medical or surgical options.   - medical: POP/OCP, vaginal ring, patch, depo provera, implant or IUD, or TXA  - surgical: endometrial ablation, UAE, or hysterectomy. Would encourage medical option to       11/30 IM     Chief Complaint: vaginal bleeding        Subjective:  Patient states vaginal bleeding is much better no abd pain no cp no sob        Objective:  Review of Systems:   A comprehensive review of systems was completed; pertinent positive and negatives stated in subjective.     Vital signs:  Temp:  [97.6 °F (36.4 °C)-98.2 °F (36.8 °C)] 97.6 °F (36.4 °C)  Pulse:  [] 80  Resp:  [15-20] 15  BP: (119-149)/(72-86) 149/86  SpO2:  [97 %-100 %] 97 %     Physical Exam:    General: No acute distress  Respiratory: No wheezes, no rhonchi  Cardiovascular: S1, S2, regular rate and rhythm  Abdomen: Soft, Non-tender, non-distended, positive bowel sounds  Neuro: No new focal deficits.   Extremities: No edema        Diagnostic Data:    Labs:         Recent Labs   Lab 11/28/24 2344 11/29/24  1027 11/29/24 2057 11/30/24  0931   WBC 13.7* 9.8  --  9.1   HGB 5.9* 7.4* 7.1* 7.1*   MCV 61.2* 70.9*  --  68.6*   .0 240.0  --  274.0              Recent Labs   Lab 11/28/24  2344 11/29/24  0518   * 117*   BUN 17 15   CREATSERUM 1.18* 0.83   CA 9.1 7.9*   ALB 4.1  --     141   K 3.6 3.5    113*   CO2 19.0* 20.0*   ALKPHO 49   --    AST 15  --    ALT 21  --    BILT 0.5  --    TP 6.3  --           Assessment & Plan:  #anemia better  #Vaginal bleeding better  #sinus tachycardia resolved  -hgb 5.9: baseline 14.6 in 2010  -transfused 2 U PRBC   - pelvic US reviewed  -GYN c/s appreciated     #NAGMA  -CO2 of 19 with AG of 8  -cont to monitor BMP     #elevated serum creatinine  -Cr 1.18 with baseline 1.23  -at baseline     #leukocytosis  -WBC 13.7  -no infectious symptoms, suspect 2/2 acute stress  -cont to monitor CBC     #depression  -cont home escitalopram        MEDICATIONS ADMINISTERED IN LAST 1 DAY:  acetaminophen (Tylenol Extra Strength) tab 500 mg                       oxyCODONE immediate release tab 5 mg  Dose: 5 mg  Freq: Once Route: OR  Start: 12/01/24 0230 End: 12/01/24 0237 0237 JOHN-Given        sodium chloride 0.9 % IV bolus 1,000 mL  Dose: 1,000 mL  Freq: Once Route: IV  Last Dose: Stopped (11/29/24 0046)  Start: 11/28/24 2331 End: 11/29/24 0046 2346 MM-New Bag      0046 BG-Stopped          sodium chloride 0.9% infusion  Freq: Once Route: IV  Last Dose: Stopped (12/01/24 0230)  Start: 11/30/24 2245 End: 12/01/24 0230   Admin Instructions:   Use a 250mL bag: To standard blood administration set with integral filter. Change every 4 hours or after 2 units, whichever comes first. ALERT: NEVER mix any medication or solution with blood or blood products.  Run at KVO rate             0200 JOHN-New Bag     0230 JOHN-Stopped        sodium chloride 0.9% infusion  Freq: Once Route: IV  Start: 11/29/24 0230 End: 11/29/24 0230   Admin Instructions:   Use a 250mL bag: To standard blood administration set with integral filter. Change every 4 hours or after 2 units, whichever comes first. ALERT: NEVER mix any medication or solution with blood or blood products.  Run at KVO rate           0230 SO-New Bag          sodium ferric gluconate (Ferrlecit) 125 mg in sodium chloride 0.9% 100mL IVPB premix  Dose: 125 mg  Freq: Nightly Route:  IV  Last Dose: Stopped (11/30/24 2302)  Start: 11/30/24 2115 End: 12/01/24 2245 2202 JOHN-New Bag     2302 JOHN-Stopped           2245-D/C'd      tranexamic acid in sodium chloride 0.7% (Cyklokapron) 1000 mg/100mL infusion premix 1,000 mg  Dose: 1,000 mg  Freq: Once Route: IV  Last Dose: 1,000 mg (12/01/24 1226)  Start: 12/01/24 1030 End: 12/01/24 1236                    ondansetron (Zofran) 4 MG/2ML injection 4 mg  Dose: 4 mg  Freq: Every 6 hours PRN Route: IV  PRN Reason: Nausea  Start: 12/01/24 1243 End: 12/01/24 2245 1248 KY-Given     2245-D/C'd           Date Action Dose Route User    Discharged on 12/1/2024 12/1/2024 1549 Given 500 mg Oral Brenda Brown, RN           Vitals (last day) before discharge       Date/Time Temp Pulse Resp BP SpO2 Weight O2 Device O2 Flow Rate (L/min) [facility]    12/01/24 1955 98.3 °F (36.8 °C) 81 15 121/76 99 % -- -- --     12/01/24 1530 97.6 °F (36.4 °C) 78 17 134/72 98 % -- None (Room air) -- MO    12/01/24 1130 97.9 °F (36.6 °C) 85 18 118/82 97 % -- None (Room air) -- MO    12/01/24 0800 97.5 °F (36.4 °C) 78 18 131/84 98 % -- None (Room air) -- MO    12/01/24 0542 98.1 °F (36.7 °C) 73 -- 122/72 -- -- None (Room air) -- PW    12/01/24 0241 97.8 °F (36.6 °C) 78 18 130/90 -- -- None (Room air) -- JOHN    12/01/24 0044 97.8 °F (36.6 °C) 75 -- 124/67 99 % -- None (Room air) -- PW    11/30/24 2340 98 °F (36.7 °C) 83 -- 134/81 99 % -- -- -- JOHN    11/30/24 2306 97.8 °F (36.6 °C) 78 18 139/82 100 % -- None (Room air) -- JOHN    11/30/24 2044 -- 89 18 145/83 -- -- -- -- JOHN    11/30/24 2043 -- -- -- -- 96 % -- None (Room air) -- JOHN    11/30/24 1950 98.1 °F (36.7 °C) 85 -- 142/90 100 % -- None (Room air) -- PW    11/30/24 1630 98.7 °F (37.1 °C) 91 16 146/85 99 % -- None (Room air) -- DK    11/30/24 1520 -- 86 -- 140/77 99 % -- None (Room air) -- [place]    11/30/24 1222 98.3 °F (36.8 °C) 86 16 148/83 98 % -- None (Room air) -- DK    11/30/24 0832 97.6 °F (36.4 °C) 80 15 149/86 97 %  -- None (Room air) -- DK    11/30/24 0420 97.6 °F (36.4 °C) 78 -- 134/82 98 % -- None (Room air) -- PW    11/30/24 0013 98.2 °F (36.8 °C) 86 20 119/73 99 % -- None (Room air) -- PW            11/29/24 2025 97.8 °F (36.6 °C) 87 20 137/81 99 % -- None (Room air) -- PW   11/29/24 1637 97.7 °F (36.5 °C) 96 18 141/74 -- -- -- -- EV   11/29/24 1100 97.9 °F (36.6 °C) 101 18 128/72 100 % -- -- -- EV   11/29/24 0945 97.8 °F (36.6 °C) -- -- 127/78 -- -- -- -- JS   11/29/24 0906 98.5 °F (36.9 °C) 98 18 122/70 99 % -- -- -- EV   11/29/24 0650 98.6 °F (37 °C) 100 18 122/74 100 % -- -- -- SO       11/29/24 0400 -- 111 -- -- 100 % -- -- -- SO   11/29/24 0310 98.3 °F (36.8 °C) -- 20 -- -- 220 lb (99.8 kg) -- -- SO   11/29/24 0231 97.7 °F (36.5 °C) 98 22 127/64 99 % -- None (Room air) -- SF   11/29/24 0052 -- 122 Abnormal  20 121/77 -- -- None (Room air) -- MM   11/29/24 0022 -- 129 Abnormal  20 146/95 Abnormal  100 % -- None (Room air) -- MM   11/29/24 0008 -- 111 20 129/79 100 % -- -- -- MM       Blood Transfusion Record       Product Unit Status Volume Start End            Transfuse RBC       24  [id number]  [id number] Completed 12/01/24 0239 453.58 mL 11/30/24 2307 12/01/24 0238       24  [id number]  [id number] Completed 11/29/24 0942 525 mL 11/29/24 0530 11/29/24 0942       24  [id number]  [id number] Completed 11/29/24 0550 343.75 mL 11/29/24 0245 11/29/24 0530

## 2024-12-02 NOTE — DISCHARGE PLANNING
NURSING DISCHARGE NOTE    Discharged Home via Wheelchair.  Accompanied by Family member  Belongings Taken by patient/family.  Discharge instructions given by RN.

## 2024-12-02 NOTE — PROGRESS NOTES
[address] [address]  ?  12/01/24  ?  Re: Jermaine Leigh  ?  To Whom It May Concern:    Jermaine Leigh was admitted to [facility] from 11/28/2024 to 12/01/24.    Please excuse Jermaine Leigh from attending work for these days.      Patient will follow up with their primary care physician upon discharge.   Further restrictions and work excuse will be based on primary care physician's evaluation.  ?  Thank you,    Samm Green MD, MD  [facility]

## 2024-12-02 NOTE — PAYOR COMM NOTE
Discharge Notification    Patient Name: MAC MENJIVAR  Payor: [employer] OUT OF STATE POS/MCNP  Subscriber #: [id number]  Authorization Number: [id number]  Admit Date/Time: 11/28/2024 11:26 PM  Discharge Date/Time: 12/1/2024 8:44 PM

## 2024-12-03 NOTE — TELEPHONE ENCOUNTER
Patient is asking for a note to work for the dates from Dec 2 - Dec 4 ,since she was in the hospital. Please advise

## 2024-12-03 NOTE — TELEPHONE ENCOUNTER
Pt is requesting a note to miss work 12/2-12/4. Hospitalist provided note for days hospitalized 11/28-12/1. Pt has not been seen in our office yet, has appt on 12/6. Did you want pt off of work 12/2-12/4 from OB/GYN standpoint?  Or should pt contact PCP for note?

## 2024-12-03 NOTE — PROGRESS NOTES
Transitional Care Management   Discharge Date: 24  Contact Date: 12/3/2024    Assessment:  TCM Initial Assessment    General:  Assessment completed with: Patient  Patient Subjective: I think I'm doing okay. I don't seem to be getting dizzy or nauseas; no more signs of anemia. There is still some bleeding but it has slowed down significantly. I'm hoping the medication will help. If I do too much too fast my heart rate will go up but I don't feel anything like dizziness. I can walk and bend down normally  Chief Complaint: Anemia  Verify patient name and  with patient/ caregiver: Yes    Hospital Stay/Discharge:  Tell me what you understand of why you were in the hospital or emergency department: low hemoglobin  Prior to leaving the hospital were your Discharge Instructions reviewed with you?: Yes  Did you receive a copy of your written Discharge Instructions?: Yes  What questions do you have about your Discharge Instructions?: none  Do you feel better or worse since you left the hospital or emergency department?: Better    Follow - Up Appointment:  Do you have a follow-up appointment?: Yes  Date: 24  Physician: OB/Gyne  Are there any barriers to getting to your follow-up appointment?: No    Home Health/DME:  Prior to leaving the hospital was Home Health (HH) arranged for you?: No     Prior to leaving the hospital or emergency department was Durable Medical Equipment (DME), medical supplies, or infusions arranged for you?: No  Are DME/medical supply/infusions needs identified by staff during this assessment?: No     Medications/Diet:  Did any of your medications change, during or after your hospital stay or ED visit?: Yes  Do you have your new or updated medications?: Yes  Do you understand what your medications are for and possible side effects?: Yes  Are there any reasons that keep you from taking your medication as prescribed?: No  Any concerns about medication refills?: No    Were you given a different  diet per your Discharge Instructions?: No     Questions/Concerns:  Do you have any questions or concerns that have not been discussed?: No       Nursing Interventions: NCM reviewed discharge instructions and when to seek medical attention with the patient. She states that she is feeling fine. She states that the bleeding is still there but has slowed down significantly and is hoping the medication will help even more. She denies having any fever, n/v/c/d, sob, lightheaded/dizziness, HA, palpitations, pain or any new or worsening symptoms. Med review completed. She denied having any questions or concerns at this time.     Medication Review:   Current Outpatient Medications   Medication Sig Dispense Refill    tranexamic acid (LYSTEDA) 650 MG Oral Tab Take 2 tablets (1,300 mg total) by mouth every 8 (eight) hours. For up to 5 days every month with menses 90 tablet 2    ESCITALOPRAM 10 MG Oral Tab TAKE 1 TABLET(10 MG) BY MOUTH EVERY DAY 90 tablet 0     Did patient review medications using current pill bottles and not just a medication list?  No  Discharge medications reviewed/discussed/and reconciled against outpatient medications with patient.  Any changes or updates to medications sent to primary care provider.  Patient Acknowledged    SDOH:   Transportation Needs: No Transportation Needs (11/29/2024)    Transportation Needs     Lack of Transportation: No     Car Seat: Not on file           Follow-up Appointments:  Your appointments       Date & Time Appointment Department (Center)    Dec 06, 2024 10:45 AM CST Follow Up Visit with Mray Rod MD [facility] ([facility])              [facility]  [address] [place] [id number] [phone]            Transitional Care Clinic  Was TCC Ordered: Yes  Was TCC Scheduled: No, Explain -pt  states that she will be following up with Gyne on Friday      Specialist  Does the patient have any other follow-up appointment(s) that need to be scheduled? Yes   -If yes: Nurse Care Manager reviewed upcoming specialist appointments with patient: Yes   -Does the patient need assistance scheduling appointment(s): No, pt has appt with OB/Gyne on 12/6/24    CCM referral placed:  No    Book By Date: 12/15/24

## 2024-12-04 NOTE — TELEPHONE ENCOUNTER
Sandy Briones, DO to [facility] Nurse       12/4/24 10:53 AM  No need for further days off work from ob/gyn standpoint, patient can contact PCP if she needs more days.

## 2024-12-06 NOTE — PROGRESS NOTES
[facility]  Obstetrics and Gynecology    Subjective:     Jermaine Leigh is a 34 year old  who presents for hospital follow up after admission for AUB to anemia. Bleeding has significantly decreased since she was in the hospital. While she was evaluated she had incidental finding of large pelvic cyst.    Patient's last menstrual period was 2024.  Menarche: 14 (2024 11:08 AM)  Period Cycle (Days): 28-30 days (2024 11:08 AM)  Period Duration (Days): 7 (2024 11:08 AM)  Period Flow: heavy (2024 11:08 AM)  Use of Birth Control (if yes, specify type): Abstinence (2024 11:08 AM)  Pap Result Notes: Pt states she has not had pap before (2024 11:08 AM)     Past Medical History:    ADHD    Depression      Past Surgical History:   Procedure Laterality Date    Adenoidectomy  age 14 or 15    Appendectomy  2010    [facility] - laproscopic        Objective:     Vitals:    24 1109   BP: 118/76   Pulse: 112   Weight: 217 lb 4 oz (98.5 kg)   Height: 66\"       Body mass index is 35.07 kg/m².    GEN: AAOx3, NAD, appears well, appears stated age  RESP: breathing comfortably  BREAST: bilaterally symmetric with no palpable masses, no nipple discharge, no skin changes  ABD: soft, NT, ND, no rebound or guarding  EXT: no edema, nontender  PELVIC:   Vulva: NEFG.   Vagina: Estrogenized, physiologic discharge. Good support.    Cervix: No lesions or tenderness.     Uterus: anteverted, 6 week size, firm, mobile, nontender.     Adnexa: fullness palpable to right, unable to palpate the dimensions of cyst   Rectal: Anus and perineum are normal.    Chaperone offered and declined.     Labs:   Latest Reference Range & Units 24 09:31 24 21:37 24 07:05 24 17:18   WBC 4.0 - 11.0 x10(3) uL 9.1  9.1 12.6 (H)   Hemoglobin 12.0 - 16.0 g/dL 7.1 (L) 6.5 (LL) 7.8 (L) 8.7 (L)   Hematocrit 35.0 - 48.0 % 22.7 (L)  25.0 (L) 27.1 (L)   Platelet Count 150.0 - 450.0 10(3)uL  274.0  270.0 340.0       Imaging:  Pelvic US 24  FINDINGS:                UTERUS:  11.29 cm x 5.59 cm x 6.17 cm     Endometrium Thickness: 1.29 cm     The uterus appears normal in size, shape, and echogenicity.   RIGHT OVARY:  3.59 cm x 2.46 cm x 3.60 cm     The right ovary appears normal in size, shape, and echogenicity. No significant masses are identified.   LEFT OVARY:  3.42 cm x 2.49 cm x 2.77 cm     The left ovary appears normal in size, shape, and echogenicity. No significant masses are identified.   CUL-DE-SAC:  Unremarkable.      OTHER:  There is a 9.5 x 9.5 x 11.6 cm indeterminate cystic structure seen at the midline superior to the bladder and in close proximity to the right adnexa.       CT scan abd/pelvis 24  PELVIC ORGANS:  There is a hypoattenuating mass, exophytic from the right uterine fundus measuring 4.3 cm consistent with a fibroid.     There is a circumscribed low-attenuation mass in the midline pelvis superior and separate from the urinary bladder and uterine fundus.  Hounsfield units recorded at 20. It is measured at 12.4 x 9.8 cm.  There is an area of focal wall thickening or   nodularity in the left anterior aspect.  Series 2, image 90. This abuts and is inseparable from the right ovary.  The right ovary has an additional ruptured/involuting follicles/functional cyst.  The left ovary appears unremarkable.       Impression   CONCLUSION:    1. Large midline cystic pelvic mass.  It is inseparable from the right ovary and is likely ovarian in origin.  Based on the large size and mild complexity, cystic neoplasm must be considered, with benign etiologies more likely than malignant.  This  correlates with the ultrasound finding.  2. Exophytic fibroid from the right uterine fundus.       Assessment:     Jermaine Leigh is a 34 year old  with large pelvic mass, suspected to be from right ovary.       Plan:     -- reviewed US findings and discussed potential etiologies of recent  pain - counseled that enlarged cysts can increase risk for ovarian torsion, which is a risk for irreparable damage to ovary. Pain may also come from cyst rupture, irritation from free fluid, or simply from the pressure of the cyst itself.   -- discussed treatment - could expectantly manage over a couple months to see if the cyst is resolving on its own or could plan for surgical treatment. Could plan for for laparoscopic cyst drainage, cystectomy, or oophorectomy - I would recommend laparoscopic cystectomy but pending intra-op findings may need oophorectomy.  -- risks of surgery were discussed with patient including but not limited to risk of infection, injury to surrounding organs such as bowel, bladder, ureters, nerves, blood vessels, bleeding, blood transfusion and emergent exploratory laparotomy    -- the only treatment that has been shown to help prevent development of ovarian cysts are OCPs, will consider.      Discussed surgical plan of laparoscopic ovarian cystectomy with possible oophorectomy. Outpatient surgery, plan for 2 weeks off work.   Surgery request sent.     Total time was 30 minutes, more than 50% of the time was spent in face-to-face counseling and/or coordination of care.      Mary Santiago MD  [facility]  12/6/2024 11:23 AM

## 2024-12-09 NOTE — DISCHARGE SUMMARY
[facility]  DISCHARGE SUMMARY     Jermaine Leigh Patient Status:  Inpatient    1990 MRN [id number]   Location [facility] 4NW-A Attending No att. providers found   Hosp Day # 2 PCP No primary care provider on file.     Date of Admission: 2024  Date of Discharge:  2024     Discharge Disposition: Home or Self Care    Discharge Diagnosis:  #anemia better  #Vaginal bleeding better today  #sinus tachycardia resolved  -hgb 5.9: baseline 14.6 in   -transfused 2 U PRBC upon coming in and 1 unit yesterday  - pelvic US reviewed  -GYN c/s appreciated     #NAGMA     -cont to monitor BMP     #elevated serum creatinine  -Cr 1.18 with baseline 1.23  -at baseline     #leukocytosis  -resolved     #depression  -cont home escitalopram    History of Present Illness: 34 year old female with PMHx depression who presented to the hospital for heavy vaginal bleeding. She started her menstrual cycle on the  and was initially waking up every hour to change her pad. She then started to notice large blood clots. She has a history of heavy periods but the longest the heavy bleeding usually lasts is 12 hours. Her bleeding continued at the same pace during the next day and she was continuing to pass a significant amount of clots. She had associated pelvic cramping rated 2/10 which is typical during her period. She tried to eat some food high in iron and initially felt a little better. Tonight she started to feel dizzy and was getting winded with palpitations and nausea any time she was walking. She had one episode of emesis on her way to to the car.     Brief Synopsis: Patient improved with transfusion of prbc.She was seen by OB and pelvic us ordered and revealed rt adnexa pelvic mass.She was started on po TXA and bleeding improved.    Lace+ Score: 19  59-90 High Risk  29-58 Medium Risk  0-28   Low Risk  Patient was referred to the [facility].    TCM Follow-Up Recommendation:  LACE < 29:  Low Risk of readmission after discharge from the hospital. No TCM follow-up needed.      Procedures during hospitalization:       Incidental or significant findings and recommendations (brief descriptions):      Lab/Test results pending at Discharge:       Consultants:  OB GYN    Discharge Medication List:     Discharge Medications        START taking these medications        Instructions Prescription details   tranexamic acid 650 MG Tabs  Commonly known as: Lysteda      Take 2 tablets (1,300 mg total) by mouth every 8 (eight) hours. For up to 5 days every month with menses   Quantity: 90 tablet  Refills: 2            CONTINUE taking these medications        Instructions Prescription details   escitalopram 10 MG Tabs  Commonly known as: Lexapro      TAKE 1 TABLET(10 MG) BY MOUTH EVERY DAY   Quantity: 90 tablet  Refills: 0               Where to Get Your Medications        These medications were sent to [id number] - [facility] [address] [place] [address] [phone], [phone]  [address] [place] [id number]      Hours: 24-hours Phone: [phone]   tranexamic acid 650 MG Tabs         [place]     Follow-up appointment:   Mary Rod MD  [address]   [address] [address] [phone]    Follow up      Appointments for Next 30 Days 2024 - 2025      None            Vital signs:       Physical Exam:    General: No acute distress   Lungs: clear to auscultation  Cardiovascular: S1, S2  Abdomen: Soft      -----------------------------------------------------------------------------------------------  PATIENT DISCHARGE INSTRUCTIONS: See electronic chart    Liss London MD    Total time spent on discharge plannin minutes     The  [employer] Cures Act makes medical notes like these available to patients in the interest of transparency. Please be advised this is a medical document. Medical documents are intended to carry relevant information, facts as evident, and the  clinical opinion of the practitioner. The medical note is intended as peer to peer communication and may appear blunt or direct. It is written in medical language and may contain abbreviations or verbiage that are unfamiliar.

## 2024-12-11 NOTE — TELEPHONE ENCOUNTER
Surgery scheduled for 1/23/25 at 730  Post op   Future Appointments   Date Time Provider Department Center   2/11/2025  8:45 AM Mary Rod MD [facility] [place]     Orders entered  Added to calendar  PA - pending  REF # [id number]

## 2024-12-11 NOTE — TELEPHONE ENCOUNTER
----- Message from Mary Santiago sent at 12/9/2024  3:27 PM CST -----  Surgeon: Dr. Mary Santiago MD  Assistant: Yes    Type of Admit: Outpatient Surgery  Procedure Location: Main OR    PreOp Dx: right ovarian cyst    Procedure: laparoscopic ovarian cystectomy, possible right oophorectomy    Anesthesia: General    Special Equipment/Comments: Veress needle, 5 mm 0 degree scope, 5 mm trocar x 3. Endocatch bag. Monopolar instruments. Ligasure device. Suction/. 4-0 vicryl SH and dermabond for abdominal incision closure.     Antibiotics: None indicated. No penicillin or cephalosporin allergy.        Pre Op Orders: initiate my Pre-op standing orders, if none exist please use [facility]'s Standing Order     Labs: None    Medical clearance: No

## 2024-12-26 NOTE — TELEPHONE ENCOUNTER
Laparoscopic ovarian cystectomy scheduled for 1/23/2025.  Patient requesting doctor's note for 2 weeks off work post-operatively, as discussed at last office visit 12/6/2024.  Letter sent via [employer].

## 2025-01-14 NOTE — TELEPHONE ENCOUNTER
Called [employer] to check the status of pending auth. And no auth was needed for CPT 09290 and 70561.  Call ref # [id number]

## 2025-01-23 NOTE — ANESTHESIA POSTPROCEDURE EVALUATION
[facility]    Jermaine Leigh Patient Status:  Hospital Outpatient Surgery   Age/Gender 34 year old female MRN [id number]   Location [facility] SURGERY Attending Mary Rod MD   Hosp Day # 0 PCP No primary care provider on file.       Anesthesia Post-op Note    LAPAROSCOPIC RIGHT SALPINGECTOMY, REMOVAL OF RIGHT PERITUBAL CYST, LYSIS OF ADHESIONS    Procedure Summary       Date: 01/23/25 Room / Location:  MAIN OR  /  MAIN OR    Anesthesia Start: 0732 Anesthesia Stop: 0925    Procedure: LAPAROSCOPIC RIGHT SALPINGECTOMY, REMOVAL OF RIGHT PERITUBAL CYST, LYSIS OF ADHESIONS (Right: Abdomen) Diagnosis:       Right ovarian cyst      (Right ovarian cyst [N83.201])    Surgeons: Mary Rod MD Anesthesiologist: Andrey Billingsley DO    Anesthesia Type: general ASA Status: 2            Anesthesia Type: general    Vitals Value Taken Time   /86 01/23/25 0925   Temp 98.0 01/23/25 0925   Pulse 95 01/23/25 0925   Resp 19 01/23/25 0925   SpO2 96 01/23/25 0925           Patient Location: PACU    Anesthesia Type: general    Airway Patency: patent    Postop Pain Control: adequate    Mental Status: mildly sedated but able to meaningfully participate in the post-anesthesia evaluation    Nausea/Vomiting: none    Cardiopulmonary/Hydration status: stable euvolemic    Complications: no apparent anesthesia related complications    Postop vital signs: stable    Dental Exam: Unchanged from Preop    Patient to be discharged from PACU when criteria met.

## 2025-01-23 NOTE — PROGRESS NOTES
I assisted Dr. Rod on a laparoscopic right salpingectomy and removal of right paratubal cyst on 1/23/2025    Sandy Briones DO  [facility] - OBGYN

## 2025-01-23 NOTE — ANESTHESIA PROCEDURE NOTES
Airway  Date/Time: 1/23/2025 7:41 AM  Urgency: elective    Airway not difficult    General Information and Staff    Patient location during procedure: OR  Anesthesiologist: Andrey Billingsley DO  Performed: anesthesiologist   Performed by: Andrey Billingsley DO  Authorized by: Andrey Billingsley DO      Indications and Patient Condition  Indications for airway management: anesthesia  Spontaneous Ventilation: absent  Sedation level: deep  Preoxygenated: yes  Patient position: sniffing  Mask difficulty assessment: 1 - vent by mask    Final Airway Details  Final airway type: endotracheal airway      Successful airway: ETT  Cuffed: yes   Successful intubation technique: direct laryngoscopy  Facilitating devices/methods: intubating stylet and cricoid pressure  Endotracheal tube insertion site: oral  Blade: Nohemi  Blade size: #3  ETT size (mm): 7.0    Cormack-Lehane Classification: grade IIA - partial view of glottis  Placement verified by: capnometry   Cuff volume (mL): 7  Measured from: lips  ETT to lips (cm): 23  Number of attempts at approach: 1  Number of other approaches attempted: 0

## 2025-01-23 NOTE — OPERATIVE REPORT
[facility]  Operative Note    Jermaine Leigh Patient Status:  Hospital Outpatient Surgery    1990 MRN [id number]   Location [facility] SURGERY Attending Mary Rod MD   Hosp Day # 0 PCP No primary care provider on file.     Pre-Operative Diagnosis: Right ovarian cyst [N83.201]     Post-Operative Diagnosis: Right ovarian cyst [N83.201]      Procedure Performed:   LAPAROSCOPIC RIGHT SALPINGECTOMY, REMOVAL OF RIGHT PERITUBAL CYST, LYSIS OF ADHESIONS    Surgeons and Role:     * Mary Rod MD - Primary     * Sandy Briones DO - Assisting Surgeon    Assistant(s):     Assistant was present throughout the entire case and was critical in ensuring adequate exposure for patient's safety and optimization of outcome.  The physician actively assisted in retraction, exposure, hemostasis, entry/closure as well as other essential operative technical functions.     Surgical Findings: large right paratubal cyst (approx 10-12cm in diameter) with serous fluid. Two 3-4cm pedunculated fibroids noted on posterior uterus. Normal appearing right ovary and left fallopian tube and ovary. Adhesions of descending colon to right pelvic sidewall.      Specimen:   ID Type Source Tests Collected by Time Destination   1 : Right Fallopian tube with paratubal cyst Tissue Fallopian tube right SURGICAL PATHOLOGY TISSUE Mary Rod MD 2025  8:46 AM          Estimated Blood Loss: Blood Output: 10 mL (2025  9:08 AM)        Procedure:   Patient was brought to the OR and general anesthesia initiated without difficulty. She was positioned in dorsal lithotomy and prepped and draped in usual fashion. Time out was performed. A blank catheter was placed in the bladder and a sponge stick placed in the vagina.   Attention was turned to the abdomen. An infraumbilical incision was made and the Veress needle used to enter the intraperitoneal cavity. The intraperitoneal cavity was confirmed to be entered by free flow  of saline and low opening pressure. The abdomen was insufflated to 15 mmHg with CO2 gas. The 5 mm trocar was inserted under direct visualization. Two more 5 mm ports, right and left lateral aspects, were placed under direct visualization.  Local anesthesia was injected prior to each incision was made.      The pelvis was evaluated and no injury or abnormalities noted - findings as above.   A laparoscopic needle was introduced and used to aspirate fluid from the cyst, which appeared thin/translucent yellow. The suction tip was then introduced into the cyst and used to remove about 650cc of this serous fluid from within the cyst, allowing the cyst wall to collapse completely. The Ligasure device was then used to perform right salpingectomy and separation of the cyst from the right ovary where it was superficially adherent. Excellent hemostasis noted. An Endocatch bag was introduced and used to remove the specimen intact from the abdomen.  Lysis of adhesions between the large bowel and pelvic sidewall was performed with the Ligasure was done in order to allow adequate visualization of the left adnexa, which otherwise appeared normal.     Hemostasis was assured and instruments were removed. The pneumoperitoneum was reduced. The incisions were closed with 4-0 Vicryl and covered with Dermabond. Dela Cruz and sponge stick removed.  Counts were correct at the end of the procedure. The patient tolerated the procedure well and was brought to the PACU in stable condition. She will be discharged home from PACU.    Complications: None      Mary Santiago MD  [facility]  1/23/2025 9:21 AM

## 2025-01-23 NOTE — ANESTHESIA PREPROCEDURE EVALUATION
PRE-OP EVALUATION    Patient Name: Jermaine Leigh    Admit Diagnosis: Right ovarian cyst [N83.201]    Pre-op Diagnosis: Right ovarian cyst [N83.201]    LAPAROSCOPIC RIGHT OVARIAN CYSTECTOMY, possible right oophorectomy    Anesthesia Procedure: LAPAROSCOPIC RIGHT OVARIAN CYSTECTOMY, possible right oophorectomy (Right)    Surgeons and Role:     * Mary Rod MD - Primary    Pre-op vitals reviewed.  Temp: 98.6 °F (37 °C)  Pulse: 78  Resp: 16  BP: 138/88  SpO2: 100 %  Body mass index is 35.28 kg/m².    Current medications reviewed.  Hospital Medications:   acetaminophen (Tylenol Extra Strength) tab 1,000 mg  1,000 mg Oral Once    scopolamine (Transderm-Scop) 1 MG/3DAYS patch 1 patch  1 patch Transdermal Once    lactated ringers infusion   Intravenous Continuous       Outpatient Medications:   Prescriptions Prior to Admission[1]    Allergies: Morphine and Prednisone      Anesthesia Evaluation    Patient summary reviewed.    Anesthetic Complications  (-) history of anesthetic complications         GI/Hepatic/Renal      (+) GERD and well controlled      (+) chronic renal disease   (+) liver disease                 Cardiovascular                       (-) CAD  (-) past MI                (-) angina              Endo/Other      (-) diabetes     (-) hypothyroidism  (-) hyperthyroidism                     Pulmonary    Negative pulmonary ROS.  (-) asthma         (-) shortness of breath            Neuro/Psych      (+) depression    (-) CVA   (-) TIA  (-) seizures                       Past Surgical History:   Procedure Laterality Date    Adenoidectomy  age 14 or 15    Append[facility]  03/11/2010    [facility]     Social History     Socioeconomic History    Marital status:    Tobacco Use    Smoking status: Never    Smokeless tobacco: Never   Vaping Use    Vaping status: Never Used   Substance and Sexual Activity    Alcohol use: No    Drug use: No    Sexual activity: Never     History   Drug Use No      Available pre-op labs reviewed.  Lab Results   Component Value Date    WBC 12.6 (H) 12/01/2024    RBC 3.79 (L) 12/01/2024    HGB 8.7 (L) 12/01/2024    HCT 27.1 (L) 12/01/2024    MCV 71.5 (L) 12/01/2024    MCH 23.0 (L) 12/01/2024    MCHC 32.1 12/01/2024    RDW 25.2 12/01/2024    .0 12/01/2024     Lab Results   Component Value Date     11/29/2024    K 3.5 11/29/2024     (H) 11/29/2024    CO2 20.0 (L) 11/29/2024    BUN 15 11/29/2024    CREATSERUM 0.83 11/29/2024     (H) 11/29/2024    CA 7.9 (L) 11/29/2024            Airway      Mallampati: II  Mouth opening: 3 FB  TM distance: 4 - 6 cm  Neck ROM: full Cardiovascular    Cardiovascular exam normal.         Dental    Dentition appears grossly intact         Pulmonary    Pulmonary exam normal.                 Other findings              ASA: 2   Plan: general  NPO status verified and patient meets guidelines.  Patient has not taken beta blockers in last 24 hours.      Comment: I spoke with the patient and discussed the risks of general anesthesia, which include nausea and vomiting; sore throat; injury to the lips, gums, teeth, and eyes; cardiac, pulmonary, and neurologic events; aspiration; and allergic reactions. The patient understands these risks and consents to receiving general anesthesia for this procedure.    Plan/risks discussed with: patient and father                Present on Admission:   Pelvic mass             [1]   Medications Prior to Admission   Medication Sig Dispense Refill Last Dose/Taking    IRON OR    1/22/2025 Morning    Vitamin D, Cholecalciferol, 25 MCG (1000 UT) Oral Tab Take 1,000 Units by mouth.   1/22/2025 Morning    ESCITALOPRAM 10 MG Oral Tab TAKE 1 TABLET(10 MG) BY MOUTH EVERY DAY 90 tablet 0 1/23/2025 at  3:00 AM    tranexamic acid (LYSTEDA) 650 MG Oral Tab Take 2 tablets (1,300 mg total) by mouth every 8 (eight) hours. For up to 5 days every month with menses (Patient taking differently: Take 2 tablets (1,300  mg total) by mouth every 8 (eight) hours. For up to 5 days every month with menses) 90 tablet 2 12/2/2024

## 2025-01-23 NOTE — DISCHARGE INSTRUCTIONS
Home Care Instructions Following Your Laparoscopic Procedure     Jermaine-  We hope you were pleased with your care at [facility].  We wish you the best outcome and overall experience with your operation.  These instructions will help to minimize pain, limit the risk for infection, and improve the likelihood of a successful result.    What to Expect:  Expect some vaginal bleeding and spotting a few days after your procedure. The bleeding might be similar to a regular period.  Call the office, if you experience heavy bleeding ( saturating one pad each hour )    Bandages (Dressing)  Keep dressings clean and dry for 2 days.  Do not remove your bandages until the 3rd day after your procedure  Do not get your bandages wet for two days.    Bathing/Showers  You may resume showers tomorrow  No baths, swimming, or hot tubs for two weeks    Wound Care  Itching, bruising, a \"pulling\" sensation, and numbness around the incision are typical. The following instructions will promote proper healing and help to prevent infection  Leave your Steri-Strips (butterfly tapes) in place as long as possible  Do not remove the Steri-Strips  Do not replace the Steri-Strips, if they come off.  If the tapes come off, leave them off, and keep incisions clean.  You do not need to cover incisions or put any medications on incisions.    Pain Medication  Norco: Take one or two tablets every four hours as needed for pain, if prescribed for you.  Do not exceed 8 (eight) Norco tablets each day  Do not take Norco, if you do not have pain.    Over-The-Counter Medication  Non-prescription anti-inflammatory medications can also help to ease the pain.  You may take Aleve or ibuprofen   Take as directed on the bottle  Drink a full glass of water with the medication    Home Medication  Resume your home medications as instructed    Diet  Resume your normal diet    Activity  No strenuous activity or heavy lifting for two weeks  You may go up and down  the stairs as tolerated.    You cannot return to work, if your work requires strenuous activity for two weeks  No physical exercise, sports, or gym workouts for two weeks  No sexual activity for 1 week    Return to Work or School  You may return to work in two days, if your work does not involve strenuous activity ( ex: office work).  Contact your gynecologist's office, if you need a medical note.  You may return to school in two days  No gym class or sports for two weeks.    Driving  Do not drive, if you are taking pain medication.    Follow-up Appointment with Your Gynecologist  Please call the office as soon as possible for an appointment in two weeks   Verify your appointment date, day, time, and location.  At your 1st postoperative office visit:  Your wounds will be evaluated, healing assessed, and any additional concerns and instructions will be discussed.    Questions or Concerns  Please call the office if you experience severe pain not controlled by pain medication, swelling, heavy bleeding, fever, or other concerns.    If your call is made after office hours, a physician on-call will be available to help you.  There is always a doctor from the group covering our gynecology patients, if your doctor is unavailable.    You received a drug called Toradol which is an anti inflammatory at: 9am  Do not take any anti inflammatory like Motrin, Aleve, or Ibuprofen until after: 3pm  Please report any suspected allergic reactions or bleeding issues to your doctor.       Jermaine  Thank you for coming to [facility] for your operation.  The nurses and the anesthesiologist try very hard to make sure you receive the best care possible.  Your trust in them as well as us is greatly appreciated.    Thanks so much,  [facility]

## 2025-01-29 NOTE — TELEPHONE ENCOUNTER
Received disability form via fax with valid outside Release of Information. Logged for processing.

## 2025-01-31 NOTE — TELEPHONE ENCOUNTER
Called patient to obtain details; patient requesting continuous short term disability due to surgery.       Type of Leave: Continuous short term disability   Reason for Leave: Surgery laparoscopic right salpingectomy, removal of right peritubal cyst, lysis of adhesions  Start date of leave: 01/23/25  End date of leave: 02/06/25 Return to work on 02/07/25 (letter in the chart)

## 2025-02-11 NOTE — PROGRESS NOTES
[facility]  Obstetrics and Gynecology    Subjective:     Jermaine Leigh is a 34 year old  who presents for post-op visit 2 weeks s/p LAPAROSCOPIC RIGHT SALPINGECTOMY, REMOVAL OF RIGHT PERITUBAL CYST, LYSIS OF ADHESIONS .  She is feeling well. She denies pain, bleeding, or abnormal vaginal discharge. Tolerating PO, ambulating, and voiding spontaneously. Denies issue with bowel movements.   Denies f/c, SOB, CP, N/V.     Objective:     Vitals:    25 0905   BP: 120/78   Pulse: 106   Weight: 220 lb 3.2 oz (99.9 kg)   Height: 66\"       Body mass index is 35.54 kg/m².    GEN: AAOx3, NAD, appears well, appears stated age  RESP: breathing comfortably  ABD: soft, NT, ND, no rebound or guarding  EXT: non tender, no edema  PELVIC: deferred    Labs:  Final Diagnosis:   Right fallopian tube and paratubal cyst:  -Benign paratubal cystadenofibroma.  -No evidence of atypia or malignancy.  -Benign fallopian tube, including fimbria.         Assessment:     Jermaine Leigh is a 34 year old  s/p LAPAROSCOPIC RIGHT SALPINGECTOMY, REMOVAL OF RIGHT PERITUBAL CYST, LYSIS OF ADHESIONS for ovarian cyst, doing well.      Plan:     -- reviewed benign pathology results  -- encouraged continued rest/recovery, increase activity as tolerated  -- monitor menstrual bleeding, return to office with any concerns    -- Follow up for annual or sooner as needed    Mary Santiago MD  [facility]  2025 9:18 AM

## 2025-02-11 NOTE — TELEPHONE ENCOUNTER
Form completed and e-faxed to [facility] [phone] awaiting confirmation. Confirmation received. Abazab message sent to patient.

## 2025-02-11 NOTE — TELEPHONE ENCOUNTER
Clark Smith,    Please sign off on form if you agree to:   Disability due to surgery. Start date 1/23/25 Return to work 2/7/25  -Signature page will be the first page scanned  -From your Inbasket, Highlight the patient and click Chart   -Double click the 01/29/25 Forms Completion telephone encounter  -Scroll down to the Media section   -Click the blue Hyperlink: Pamela Clement, 2/11/25  -Click Acknowledge located in the top right ribbon/menu   -Drag the mouse into the blank space of the document and a + sign will appear. Left click to   electronically sign the document.  -Once signed, simply exit out of the screen and you signature will be saved.     Thank you so much for your time,  Kristin

## 2025-02-20 NOTE — TELEPHONE ENCOUNTER
1/23/2025- LAPAROSCOPIC RIGHT SALPINGECTOMY, REMOVAL OF RIGHT PERITUBAL CYST, LYSIS OF ADHESIONS     2/13/2025- FMLA documents submitted by Forms Dept.  Return to work date listed as 2/7/2025.    Return to Work from received from patient via [email or web address].  Asking to return to work on 2/6/2025.  Form placed in Dr. Rod's bin in [facility].  Routed to provider for review.

## 2025-02-21 NOTE — TELEPHONE ENCOUNTER
Return to work note filled out and signed by Dr. Rod, called patient she will be up from the [place] office today - faxed note to [place] fax and sent WebEx to notify staff. Also copy of note to scanning per Dr. Rod

## (undated) NOTE — LETTER
24      RE: Jermaine Leigh  : 1990      To Whom It May Concern:    Jermaine Leigh is currently under my care.  This letter is to inform you that on 2025, Jermaine Leigh will undergo gynecologic surgery. She will require a minimum of 2 weeks off work to allow for adequate recovery.    This return to work date may be change depending on the patient’s recovery from her surgery and her post-operative status.  A specific date will be determined at her post operative visit.      If you have any questions do not hesitate to contact me at any time.    Sincerely yours,      Mary Rod MD